# Patient Record
Sex: FEMALE | Race: WHITE | NOT HISPANIC OR LATINO | URBAN - METROPOLITAN AREA
[De-identification: names, ages, dates, MRNs, and addresses within clinical notes are randomized per-mention and may not be internally consistent; named-entity substitution may affect disease eponyms.]

---

## 2017-01-18 ENCOUNTER — IMPORTED ENCOUNTER (OUTPATIENT)
Dept: URBAN - METROPOLITAN AREA CLINIC 9 | Facility: CLINIC | Age: 62
End: 2017-01-18

## 2017-01-31 ENCOUNTER — IMPORTED ENCOUNTER (OUTPATIENT)
Dept: URBAN - METROPOLITAN AREA CLINIC 9 | Facility: CLINIC | Age: 62
End: 2017-01-31

## 2018-05-02 ENCOUNTER — IMPORTED ENCOUNTER (OUTPATIENT)
Dept: URBAN - METROPOLITAN AREA CLINIC 9 | Facility: CLINIC | Age: 63
End: 2018-05-02

## 2019-01-21 ENCOUNTER — IMPORTED ENCOUNTER (OUTPATIENT)
Dept: URBAN - METROPOLITAN AREA CLINIC 9 | Facility: CLINIC | Age: 64
End: 2019-01-21

## 2019-01-29 ENCOUNTER — IMPORTED ENCOUNTER (OUTPATIENT)
Dept: URBAN - METROPOLITAN AREA CLINIC 9 | Facility: CLINIC | Age: 64
End: 2019-01-29

## 2019-05-29 ENCOUNTER — IMPORTED ENCOUNTER (OUTPATIENT)
Dept: URBAN - METROPOLITAN AREA CLINIC 9 | Facility: CLINIC | Age: 64
End: 2019-05-29

## 2019-07-03 NOTE — PATIENT DISCUSSION
[place]: Educated patient about diagnosis. New spectacle prescription given to patient. Educated patient about differences and advantages/disadvantages of NVO, DVO, bifocal, and progressive lens style spectacles. Will continue to monitor.

## 2019-09-10 ENCOUNTER — IMPORTED ENCOUNTER (OUTPATIENT)
Dept: URBAN - METROPOLITAN AREA CLINIC 9 | Facility: CLINIC | Age: 64
End: 2019-09-10

## 2020-04-20 ENCOUNTER — IMPORTED ENCOUNTER (OUTPATIENT)
Dept: URBAN - METROPOLITAN AREA CLINIC 9 | Facility: CLINIC | Age: 65
End: 2020-04-20

## 2020-04-22 ENCOUNTER — IMPORTED ENCOUNTER (OUTPATIENT)
Dept: URBAN - METROPOLITAN AREA CLINIC 9 | Facility: CLINIC | Age: 65
End: 2020-04-22

## 2020-04-29 ENCOUNTER — IMPORTED ENCOUNTER (OUTPATIENT)
Dept: URBAN - METROPOLITAN AREA CLINIC 9 | Facility: CLINIC | Age: 65
End: 2020-04-29

## 2020-05-27 ENCOUNTER — IMPORTED ENCOUNTER (OUTPATIENT)
Dept: URBAN - METROPOLITAN AREA CLINIC 9 | Facility: CLINIC | Age: 65
End: 2020-05-27

## 2020-08-19 ENCOUNTER — IMPORTED ENCOUNTER (OUTPATIENT)
Dept: URBAN - METROPOLITAN AREA CLINIC 9 | Facility: CLINIC | Age: 65
End: 2020-08-19

## 2021-06-15 ENCOUNTER — IMPORTED ENCOUNTER (OUTPATIENT)
Dept: URBAN - METROPOLITAN AREA CLINIC 9 | Facility: CLINIC | Age: 66
End: 2021-06-15

## 2021-08-18 ENCOUNTER — IMPORTED ENCOUNTER (OUTPATIENT)
Dept: URBAN - METROPOLITAN AREA CLINIC 9 | Facility: CLINIC | Age: 66
End: 2021-08-18

## 2021-09-21 ENCOUNTER — IMPORTED ENCOUNTER (OUTPATIENT)
Dept: URBAN - METROPOLITAN AREA CLINIC 9 | Facility: CLINIC | Age: 66
End: 2021-09-21

## 2021-10-19 ASSESSMENT — VISUAL ACUITY
OS_CC: 20/30 SN
OS_CC: 20/20 SN
OD_CC: 20/40 SN
OS_CC: 20/20 SN
OS_SC: 20/30 - SN
OS_CC: 20/20 SN
OS_SC: 20/30 -2 SN
OD_CC: 20/20 -2 SN
OS_CC: 20/30 + SN
OS_CC: 20/25 SN
OD_PH: 20/25 -2 SN
OD_SC: 20/40 SN
OD_CC: 20/30 + SN
OD_SC: 20/30 -2 SN
OS_SC: 20/30 + SN
OS_SC: 20/25 SN
OD_SC: 20/30 -2 SN
OD_SC: 20/25 -2 SN
OD_SC: 20/30 SN
OD_CC: 20/25 - SN
OS_SC: 20/30 SN
OD_SC: 20/30 + SN
OD_SC: 20/50 + SN
OD_SC: 20/40 - SN
OS_SC: 20/30 - SN
OD_CC: 20/25 SN
OS_SC: 20/50 SN
OD_CC: 20/25 - SN
OD_SC: 20/30 -2 SN
OD_SC: 20/40 SN
OS_CC: 20/20 - SN
OD_SC: 20/40 SN
OD_CC: 20/25 - SN
OD_CC: 20/30 - SN
OD_CC: 20/20 - SN

## 2021-10-19 ASSESSMENT — TONOMETRY
OS_IOP_MMHG: 14
OD_IOP_MMHG: 15
OD_IOP_MMHG: 13
OS_IOP_MMHG: 12
OS_IOP_MMHG: 13
OD_IOP_MMHG: 17
OS_IOP_MMHG: 15
OD_IOP_MMHG: 20
OS_IOP_MMHG: 16
OD_IOP_MMHG: 15
OD_IOP_MMHG: 19
OS_IOP_MMHG: 13
OS_IOP_MMHG: 16
OD_IOP_MMHG: 15
OS_IOP_MMHG: 13
OD_IOP_MMHG: 15
OS_IOP_MMHG: 19
OD_IOP_MMHG: 17
OS_IOP_MMHG: 13
OD_IOP_MMHG: 18
OS_IOP_MMHG: 17
OD_IOP_MMHG: 18
OD_IOP_MMHG: 21
OD_IOP_MMHG: 14
OS_IOP_MMHG: 16
OD_IOP_MMHG: 18
OS_IOP_MMHG: 13
OS_IOP_MMHG: 13
OD_IOP_MMHG: 12
OS_IOP_MMHG: 16

## 2022-04-28 ENCOUNTER — EMERGENCY VISIT (OUTPATIENT)
Dept: URBAN - METROPOLITAN AREA CLINIC 16 | Facility: CLINIC | Age: 67
End: 2022-04-28

## 2022-04-28 DIAGNOSIS — S05.02XA: ICD-10-CM

## 2022-04-28 PROCEDURE — 99212 OFFICE O/P EST SF 10 MIN: CPT

## 2022-04-28 ASSESSMENT — VISUAL ACUITY
OD_CC: 20/20-2
OS_CC: 20/25

## 2022-06-14 ENCOUNTER — ESTABLISHED PATIENT (OUTPATIENT)
Dept: URBAN - METROPOLITAN AREA CLINIC 14 | Facility: CLINIC | Age: 67
End: 2022-06-14

## 2022-06-14 DIAGNOSIS — H16.223: ICD-10-CM

## 2022-06-14 DIAGNOSIS — H52.13: ICD-10-CM

## 2022-06-14 DIAGNOSIS — H35.373: ICD-10-CM

## 2022-06-14 PROCEDURE — 92015 DETERMINE REFRACTIVE STATE: CPT

## 2022-06-14 PROCEDURE — 92134 CPTRZ OPH DX IMG PST SGM RTA: CPT

## 2022-06-14 PROCEDURE — 92014 COMPRE OPH EXAM EST PT 1/>: CPT

## 2022-06-14 ASSESSMENT — TONOMETRY
OS_IOP_MMHG: 13
OD_IOP_MMHG: 14

## 2022-07-01 RX ORDER — METFORMIN HYDROCHLORIDE 500 MG/1
TABLET, EXTENDED RELEASE ORAL
COMMUNITY
Start: 2021-10-27

## 2022-07-01 RX ORDER — TRAZODONE HYDROCHLORIDE 100 MG/1
TABLET ORAL
COMMUNITY

## 2022-07-01 RX ORDER — TIZANIDINE 4 MG/1
TABLET ORAL
COMMUNITY
Start: 2021-12-14 | End: 2022-10-26

## 2022-07-01 RX ORDER — IBUPROFEN 200 MG
TABLET ORAL
COMMUNITY
End: 2022-10-26

## 2022-07-01 RX ORDER — TRIAMTERENE AND HYDROCHLOROTHIAZIDE 75; 50 MG/1; MG/1
TABLET ORAL
COMMUNITY

## 2022-07-01 RX ORDER — CLONAZEPAM 0.5 MG/1
TABLET ORAL
COMMUNITY
End: 2022-10-26

## 2022-08-29 PROBLEM — R00.0 RACING HEART BEAT: Status: ACTIVE | Noted: 2022-08-29

## 2022-08-29 PROBLEM — K21.9 GASTROESOPHAGEAL REFLUX DISEASE: Status: ACTIVE | Noted: 2022-08-29

## 2022-08-29 PROBLEM — G54.1: Status: ACTIVE | Noted: 2022-08-29

## 2022-08-29 PROBLEM — M16.11 PRIMARY OSTEOARTHRITIS OF RIGHT HIP: Status: ACTIVE | Noted: 2022-08-29

## 2022-08-29 PROBLEM — I10 ESSENTIAL HYPERTENSION: Status: ACTIVE | Noted: 2022-08-29

## 2022-08-29 PROBLEM — N95.8 ARTIFICIAL MENOPAUSE: Status: ACTIVE | Noted: 2022-08-29

## 2022-08-29 PROBLEM — F41.1 GENERALIZED ANXIETY DISORDER: Status: ACTIVE | Noted: 2022-08-29

## 2022-08-29 PROBLEM — R26.81 GAIT INSTABILITY: Status: ACTIVE | Noted: 2022-08-29

## 2022-08-29 PROBLEM — N18.31 CHRONIC RENAL IMPAIRMENT, STAGE 3A (HCC): Status: ACTIVE | Noted: 2022-08-29

## 2022-08-29 PROBLEM — E66.9 OBESITY (BMI 30.0-34.9): Status: ACTIVE | Noted: 2022-08-29

## 2022-08-29 PROBLEM — M48.061 DEGENERATIVE LUMBAR SPINAL STENOSIS: Status: ACTIVE | Noted: 2022-08-29

## 2022-08-29 PROBLEM — F33.0 MILD EPISODE OF RECURRENT MAJOR DEPRESSIVE DISORDER (HCC): Status: ACTIVE | Noted: 2022-08-29

## 2022-08-29 PROBLEM — R39.11 URINARY HESITANCY: Status: ACTIVE | Noted: 2022-08-29

## 2022-08-29 PROBLEM — G89.29 OTHER CHRONIC PAIN: Status: ACTIVE | Noted: 2022-08-29

## 2022-08-29 PROBLEM — M51.36 DEGENERATION OF INTERVERTEBRAL DISC OF LUMBAR REGION: Status: ACTIVE | Noted: 2022-08-29

## 2022-08-29 PROBLEM — R20.2 PARESTHESIA: Status: ACTIVE | Noted: 2022-08-29

## 2022-08-29 PROBLEM — Z96.651 HISTORY OF TOTAL RIGHT KNEE REPLACEMENT: Status: ACTIVE | Noted: 2022-08-29

## 2022-08-29 PROBLEM — M79.605 PAIN IN LEFT LEG: Status: ACTIVE | Noted: 2022-08-29

## 2022-08-29 PROBLEM — F98.8 ADD (ATTENTION DEFICIT DISORDER): Status: ACTIVE | Noted: 2022-08-29

## 2022-08-29 PROBLEM — R73.9 HYPERGLYCEMIA: Status: ACTIVE | Noted: 2022-08-29

## 2022-08-29 PROBLEM — Z96.652 PRESENCE OF LEFT ARTIFICIAL KNEE JOINT: Status: ACTIVE | Noted: 2022-08-29

## 2022-08-29 PROBLEM — M17.9 OSTEOARTHRITIS OF KNEE: Status: ACTIVE | Noted: 2022-08-29

## 2022-08-29 PROBLEM — E83.52 HYPERCALCEMIA: Status: ACTIVE | Noted: 2022-08-29

## 2022-08-29 PROBLEM — M54.50 LOW BACK PAIN: Status: ACTIVE | Noted: 2022-08-29

## 2022-08-29 PROBLEM — S32.010G COMPRESSION FRACTURE OF FIRST LUMBAR VERTEBRA WITH DELAYED HEALING: Status: ACTIVE | Noted: 2022-08-29

## 2022-08-29 PROBLEM — N76.1 SUBACUTE VAGINITIS: Status: ACTIVE | Noted: 2022-08-29

## 2022-08-29 PROBLEM — T84.023A INSTABILITY OF INTERNAL LEFT KNEE PROSTHESIS (HCC): Status: ACTIVE | Noted: 2022-08-29

## 2022-08-29 PROBLEM — M53.3 PAIN OF LEFT SACROILIAC JOINT: Status: ACTIVE | Noted: 2022-08-29

## 2022-08-29 PROBLEM — K75.81 NONALCOHOLIC STEATOHEPATITIS (NASH): Status: ACTIVE | Noted: 2022-08-29

## 2022-08-29 PROBLEM — R73.03 PREDIABETES: Status: ACTIVE | Noted: 2022-08-29

## 2022-10-26 PROBLEM — M54.16 LUMBAR RADICULOPATHY: Status: ACTIVE | Noted: 2022-10-26

## 2022-10-26 PROBLEM — I10 HYPERTENSIVE DISORDER: Status: ACTIVE | Noted: 2022-10-26

## 2022-10-26 PROBLEM — M51.16 LUMBAR DISC HERNIATION WITH RADICULOPATHY: Status: ACTIVE | Noted: 2022-10-26

## 2022-10-26 PROBLEM — M54.10 RADICULAR PAIN: Status: ACTIVE | Noted: 2022-10-26

## 2022-10-26 PROBLEM — R12 HEARTBURN: Status: ACTIVE | Noted: 2022-10-26

## 2022-10-26 PROBLEM — M54.10 RADICULOPATHY WITH LOWER EXTREMITY SYMPTOMS: Status: ACTIVE | Noted: 2022-10-26

## 2022-10-26 PROBLEM — M51.36 DEGENERATION OF LUMBAR INTERVERTEBRAL DISC: Status: ACTIVE | Noted: 2022-10-26

## 2022-10-26 PROBLEM — M19.90 ARTHRITIS: Status: ACTIVE | Noted: 2022-10-26

## 2022-10-26 PROBLEM — M48.061 LUMBAR FORAMINAL STENOSIS: Status: ACTIVE | Noted: 2022-10-26

## 2022-10-26 PROBLEM — D41.4: Status: ACTIVE | Noted: 2022-10-26

## 2022-10-26 PROBLEM — M51.26 HERNIATED LUMBAR INTERVERTEBRAL DISC: Status: ACTIVE | Noted: 2022-10-26

## 2022-10-26 PROBLEM — F41.9 ANXIETY: Status: ACTIVE | Noted: 2022-10-26

## 2022-10-26 PROBLEM — M51.9 DISORDER OF INTERVERTEBRAL DISC OF LUMBAR SPINE: Status: ACTIVE | Noted: 2022-10-26

## 2022-10-26 PROBLEM — K21.9 LARYNGOPHARYNGEAL REFLUX: Status: ACTIVE | Noted: 2019-04-23

## 2022-10-26 PROBLEM — M47.26 OTHER SPONDYLOSIS WITH RADICULOPATHY, LUMBAR REGION: Status: ACTIVE | Noted: 2022-10-26

## 2022-10-26 PROBLEM — S32.009A FRACTURE OF LUMBAR SPINE (HCC): Status: ACTIVE | Noted: 2022-10-26

## 2022-10-26 PROBLEM — T17.908A ASPIRATION INTO AIRWAY: Status: ACTIVE | Noted: 2019-04-23

## 2022-10-26 PROBLEM — J30.2 SEASONAL ALLERGIES: Status: ACTIVE | Noted: 2022-10-26

## 2023-06-13 ENCOUNTER — ESTABLISHED PATIENT (OUTPATIENT)
Dept: URBAN - METROPOLITAN AREA CLINIC 14 | Facility: CLINIC | Age: 68
End: 2023-06-13

## 2023-06-13 DIAGNOSIS — H16.223: ICD-10-CM

## 2023-06-13 DIAGNOSIS — H35.373: ICD-10-CM

## 2023-06-13 DIAGNOSIS — H11.153: ICD-10-CM

## 2023-06-13 PROCEDURE — 92014 COMPRE OPH EXAM EST PT 1/>: CPT

## 2023-06-13 PROCEDURE — 92015 DETERMINE REFRACTIVE STATE: CPT

## 2023-06-13 ASSESSMENT — VISUAL ACUITY
OS_CC: 20/40+2
OD_PH: 20/25-1
OD_SC: 20/60-1
OS_GLARE: 20/50
OU_SC: 20/30-1
OS_SC: 20/30
OD_CC: 20/30-1
OU_CC: 20/25-1
OD_GLARE: 20/30-1

## 2023-06-13 ASSESSMENT — TONOMETRY
OS_IOP_MMHG: 14
OD_IOP_MMHG: 14

## 2024-01-10 ENCOUNTER — ESTABLISHED PATIENT (OUTPATIENT)
Dept: URBAN - METROPOLITAN AREA CLINIC 14 | Facility: CLINIC | Age: 69
End: 2024-01-10

## 2024-01-10 DIAGNOSIS — D31.01: ICD-10-CM

## 2024-01-10 DIAGNOSIS — H16.223: ICD-10-CM

## 2024-01-10 DIAGNOSIS — H11.153: ICD-10-CM

## 2024-01-10 DIAGNOSIS — H35.373: ICD-10-CM

## 2024-01-10 PROCEDURE — 99213 OFFICE O/P EST LOW 20 MIN: CPT

## 2024-01-10 ASSESSMENT — VISUAL ACUITY
OS_CC: 20/25
OU_CC: 20/25
OD_CC: 20/25

## 2024-01-10 ASSESSMENT — TONOMETRY
OS_IOP_MMHG: 10
OD_IOP_MMHG: 11

## 2024-06-19 ENCOUNTER — ESTABLISHED PATIENT (OUTPATIENT)
Dept: URBAN - METROPOLITAN AREA CLINIC 14 | Facility: CLINIC | Age: 69
End: 2024-06-19

## 2024-06-19 DIAGNOSIS — D31.01: ICD-10-CM

## 2024-06-19 DIAGNOSIS — H11.153: ICD-10-CM

## 2024-06-19 DIAGNOSIS — H16.223: ICD-10-CM

## 2024-06-19 DIAGNOSIS — H35.373: ICD-10-CM

## 2024-06-19 PROCEDURE — 92134 CPTRZ OPH DX IMG PST SGM RTA: CPT

## 2024-06-19 PROCEDURE — 92014 COMPRE OPH EXAM EST PT 1/>: CPT

## 2024-06-19 ASSESSMENT — VISUAL ACUITY
OD_CC: 20/25-1
OU_CC: 20/25
OS_CC: 20/25

## 2024-06-19 ASSESSMENT — TONOMETRY
OS_IOP_MMHG: 13
OD_IOP_MMHG: 16

## 2025-08-13 ENCOUNTER — COMPREHENSIVE EXAM (OUTPATIENT)
Age: 70
End: 2025-08-13

## 2025-08-13 DIAGNOSIS — H11.153: ICD-10-CM

## 2025-08-13 DIAGNOSIS — D31.01: ICD-10-CM

## 2025-08-13 DIAGNOSIS — Z96.1: ICD-10-CM

## 2025-08-13 DIAGNOSIS — H16.223: ICD-10-CM

## 2025-08-13 DIAGNOSIS — H35.373: ICD-10-CM

## 2025-08-13 PROCEDURE — 92310-2 LEVEL 2 SOFT LENS UPDATE

## 2025-08-13 PROCEDURE — 92015 DETERMINE REFRACTIVE STATE: CPT

## 2025-08-13 PROCEDURE — 99214 OFFICE O/P EST MOD 30 MIN: CPT

## 2025-08-13 PROCEDURE — 92134 CPTRZ OPH DX IMG PST SGM RTA: CPT
